# Patient Record
Sex: MALE | Race: WHITE | NOT HISPANIC OR LATINO | URBAN - METROPOLITAN AREA
[De-identification: names, ages, dates, MRNs, and addresses within clinical notes are randomized per-mention and may not be internally consistent; named-entity substitution may affect disease eponyms.]

---

## 2017-01-17 ENCOUNTER — COMMUNICATION - HEALTHEAST (OUTPATIENT)
Dept: FAMILY MEDICINE | Facility: CLINIC | Age: 42
End: 2017-01-17

## 2017-02-13 ENCOUNTER — COMMUNICATION - HEALTHEAST (OUTPATIENT)
Dept: FAMILY MEDICINE | Facility: CLINIC | Age: 42
End: 2017-02-13

## 2017-02-13 DIAGNOSIS — F41.1 ANXIETY STATE: ICD-10-CM

## 2017-03-17 ENCOUNTER — COMMUNICATION - HEALTHEAST (OUTPATIENT)
Dept: FAMILY MEDICINE | Facility: CLINIC | Age: 42
End: 2017-03-17

## 2017-03-17 DIAGNOSIS — K21.9 ESOPHAGEAL REFLUX: ICD-10-CM

## 2017-04-16 ENCOUNTER — COMMUNICATION - HEALTHEAST (OUTPATIENT)
Dept: FAMILY MEDICINE | Facility: CLINIC | Age: 42
End: 2017-04-16

## 2017-04-16 DIAGNOSIS — I10 HYPERTENSION: ICD-10-CM

## 2017-05-18 ENCOUNTER — COMMUNICATION - HEALTHEAST (OUTPATIENT)
Dept: FAMILY MEDICINE | Facility: CLINIC | Age: 42
End: 2017-05-18

## 2017-05-18 DIAGNOSIS — F41.1 ANXIETY STATE: ICD-10-CM

## 2017-07-19 ENCOUNTER — COMMUNICATION - HEALTHEAST (OUTPATIENT)
Dept: FAMILY MEDICINE | Facility: CLINIC | Age: 42
End: 2017-07-19

## 2017-07-19 DIAGNOSIS — I10 HYPERTENSION: ICD-10-CM

## 2017-08-01 ENCOUNTER — OFFICE VISIT - HEALTHEAST (OUTPATIENT)
Dept: FAMILY MEDICINE | Facility: CLINIC | Age: 42
End: 2017-08-01

## 2017-08-01 DIAGNOSIS — E66.3 OVERWEIGHT: ICD-10-CM

## 2017-08-01 DIAGNOSIS — L82.1 SEBORRHEIC KERATOSIS: ICD-10-CM

## 2017-08-01 DIAGNOSIS — F41.1 ANXIETY STATE: ICD-10-CM

## 2017-08-01 DIAGNOSIS — Z00.00 WELL ADULT EXAM: ICD-10-CM

## 2017-08-01 DIAGNOSIS — I10 ESSENTIAL HYPERTENSION, BENIGN: ICD-10-CM

## 2017-08-01 DIAGNOSIS — K21.9 ESOPHAGEAL REFLUX: ICD-10-CM

## 2017-08-01 LAB
CHOLEST SERPL-MCNC: 264 MG/DL
FASTING STATUS PATIENT QL REPORTED: YES
HDLC SERPL-MCNC: 53 MG/DL
LDLC SERPL CALC-MCNC: 189 MG/DL
TRIGL SERPL-MCNC: 110 MG/DL

## 2017-08-01 ASSESSMENT — MIFFLIN-ST. JEOR: SCORE: 1966.51

## 2017-08-29 ENCOUNTER — RECORDS - HEALTHEAST (OUTPATIENT)
Dept: ADMINISTRATIVE | Facility: OTHER | Age: 42
End: 2017-08-29

## 2017-09-28 ENCOUNTER — RECORDS - HEALTHEAST (OUTPATIENT)
Dept: ADMINISTRATIVE | Facility: OTHER | Age: 42
End: 2017-09-28

## 2018-01-15 ENCOUNTER — OFFICE VISIT - HEALTHEAST (OUTPATIENT)
Dept: FAMILY MEDICINE | Facility: CLINIC | Age: 43
End: 2018-01-15

## 2018-01-15 DIAGNOSIS — R10.9 RIGHT SIDED ABDOMINAL PAIN: ICD-10-CM

## 2018-01-15 DIAGNOSIS — I10 ESSENTIAL HYPERTENSION, BENIGN: ICD-10-CM

## 2018-01-15 DIAGNOSIS — R74.01 ELEVATED ALT MEASUREMENT: ICD-10-CM

## 2018-01-15 DIAGNOSIS — Z78.9 ALCOHOL USE: ICD-10-CM

## 2018-01-15 DIAGNOSIS — E27.9 LESION OF ADRENAL GLAND (H): ICD-10-CM

## 2018-01-15 DIAGNOSIS — F41.1 ANXIETY STATE: ICD-10-CM

## 2018-01-15 DIAGNOSIS — K21.9 ESOPHAGEAL REFLUX: ICD-10-CM

## 2018-01-15 LAB
ALBUMIN SERPL-MCNC: 4.2 G/DL (ref 3.5–5)
ALP SERPL-CCNC: 66 U/L (ref 45–120)
ALT SERPL W P-5'-P-CCNC: 39 U/L (ref 0–45)
ANION GAP SERPL CALCULATED.3IONS-SCNC: 7 MMOL/L (ref 5–18)
AST SERPL W P-5'-P-CCNC: 21 U/L (ref 0–40)
BILIRUB SERPL-MCNC: 1.9 MG/DL (ref 0–1)
BUN SERPL-MCNC: 14 MG/DL (ref 8–22)
CALCIUM SERPL-MCNC: 9.9 MG/DL (ref 8.5–10.5)
CHLORIDE BLD-SCNC: 102 MMOL/L (ref 98–107)
CO2 SERPL-SCNC: 29 MMOL/L (ref 22–31)
CREAT SERPL-MCNC: 0.82 MG/DL (ref 0.7–1.3)
ERYTHROCYTE [DISTWIDTH] IN BLOOD BY AUTOMATED COUNT: 10.9 % (ref 11–14.5)
GFR SERPL CREATININE-BSD FRML MDRD: >60 ML/MIN/1.73M2
GLUCOSE BLD-MCNC: 96 MG/DL (ref 70–125)
HCT VFR BLD AUTO: 50.6 % (ref 40–54)
HGB BLD-MCNC: 17.2 G/DL (ref 14–18)
MCH RBC QN AUTO: 33.6 PG (ref 27–34)
MCHC RBC AUTO-ENTMCNC: 34.1 G/DL (ref 32–36)
MCV RBC AUTO: 98 FL (ref 80–100)
PLATELET # BLD AUTO: 195 THOU/UL (ref 140–440)
PMV BLD AUTO: 8.3 FL (ref 7–10)
POTASSIUM BLD-SCNC: 5 MMOL/L (ref 3.5–5)
PROT SERPL-MCNC: 7.8 G/DL (ref 6–8)
RBC # BLD AUTO: 5.14 MILL/UL (ref 4.4–6.2)
SODIUM SERPL-SCNC: 138 MMOL/L (ref 136–145)
WBC: 6.1 THOU/UL (ref 4–11)

## 2018-01-15 ASSESSMENT — MIFFLIN-ST. JEOR: SCORE: 1965.09

## 2018-02-20 ENCOUNTER — COMMUNICATION - HEALTHEAST (OUTPATIENT)
Dept: FAMILY MEDICINE | Facility: CLINIC | Age: 43
End: 2018-02-20

## 2018-02-20 DIAGNOSIS — K21.9 ESOPHAGEAL REFLUX: ICD-10-CM

## 2018-04-16 ENCOUNTER — COMMUNICATION - HEALTHEAST (OUTPATIENT)
Dept: FAMILY MEDICINE | Facility: CLINIC | Age: 43
End: 2018-04-16

## 2018-04-17 ENCOUNTER — COMMUNICATION - HEALTHEAST (OUTPATIENT)
Dept: FAMILY MEDICINE | Facility: CLINIC | Age: 43
End: 2018-04-17

## 2018-04-17 DIAGNOSIS — K21.9 ESOPHAGEAL REFLUX: ICD-10-CM

## 2018-07-25 ENCOUNTER — COMMUNICATION - HEALTHEAST (OUTPATIENT)
Dept: FAMILY MEDICINE | Facility: CLINIC | Age: 43
End: 2018-07-25

## 2018-07-25 DIAGNOSIS — F41.1 ANXIETY STATE: ICD-10-CM

## 2018-10-02 ENCOUNTER — COMMUNICATION - HEALTHEAST (OUTPATIENT)
Dept: FAMILY MEDICINE | Facility: CLINIC | Age: 43
End: 2018-10-02

## 2018-10-02 DIAGNOSIS — K21.9 ESOPHAGEAL REFLUX: ICD-10-CM

## 2018-10-04 ENCOUNTER — COMMUNICATION - HEALTHEAST (OUTPATIENT)
Dept: FAMILY MEDICINE | Facility: CLINIC | Age: 43
End: 2018-10-04

## 2018-10-04 ENCOUNTER — RECORDS - HEALTHEAST (OUTPATIENT)
Dept: ADMINISTRATIVE | Facility: OTHER | Age: 43
End: 2018-10-04

## 2018-10-04 ENCOUNTER — COMMUNICATION - HEALTHEAST (OUTPATIENT)
Dept: ADMINISTRATIVE | Facility: CLINIC | Age: 43
End: 2018-10-04

## 2018-10-04 DIAGNOSIS — I10 HYPERTENSION: ICD-10-CM

## 2018-10-08 ENCOUNTER — AMBULATORY - HEALTHEAST (OUTPATIENT)
Dept: FAMILY MEDICINE | Facility: CLINIC | Age: 43
End: 2018-10-08

## 2018-10-21 ENCOUNTER — COMMUNICATION - HEALTHEAST (OUTPATIENT)
Dept: FAMILY MEDICINE | Facility: CLINIC | Age: 43
End: 2018-10-21

## 2018-10-21 DIAGNOSIS — F41.1 ANXIETY STATE: ICD-10-CM

## 2018-11-26 ENCOUNTER — COMMUNICATION - HEALTHEAST (OUTPATIENT)
Dept: FAMILY MEDICINE | Facility: CLINIC | Age: 43
End: 2018-11-26

## 2018-11-26 DIAGNOSIS — I10 HYPERTENSION: ICD-10-CM

## 2018-11-27 ENCOUNTER — COMMUNICATION - HEALTHEAST (OUTPATIENT)
Dept: FAMILY MEDICINE | Facility: CLINIC | Age: 43
End: 2018-11-27

## 2018-12-27 ENCOUNTER — COMMUNICATION - HEALTHEAST (OUTPATIENT)
Dept: FAMILY MEDICINE | Facility: CLINIC | Age: 43
End: 2018-12-27

## 2018-12-27 DIAGNOSIS — K21.9 ESOPHAGEAL REFLUX: ICD-10-CM

## 2019-01-17 ENCOUNTER — COMMUNICATION - HEALTHEAST (OUTPATIENT)
Dept: FAMILY MEDICINE | Facility: CLINIC | Age: 44
End: 2019-01-17

## 2019-01-17 DIAGNOSIS — F41.1 ANXIETY STATE: ICD-10-CM

## 2019-04-04 ENCOUNTER — COMMUNICATION - HEALTHEAST (OUTPATIENT)
Dept: FAMILY MEDICINE | Facility: CLINIC | Age: 44
End: 2019-04-04

## 2019-04-04 DIAGNOSIS — K21.9 ESOPHAGEAL REFLUX: ICD-10-CM

## 2019-04-17 ENCOUNTER — COMMUNICATION - HEALTHEAST (OUTPATIENT)
Dept: FAMILY MEDICINE | Facility: CLINIC | Age: 44
End: 2019-04-17

## 2019-04-17 DIAGNOSIS — F41.1 ANXIETY STATE: ICD-10-CM

## 2019-04-24 ENCOUNTER — OFFICE VISIT - HEALTHEAST (OUTPATIENT)
Dept: FAMILY MEDICINE | Facility: CLINIC | Age: 44
End: 2019-04-24

## 2019-04-24 DIAGNOSIS — F41.1 GENERALIZED ANXIETY DISORDER: ICD-10-CM

## 2019-04-24 DIAGNOSIS — K21.9 ESOPHAGEAL REFLUX: ICD-10-CM

## 2019-04-24 DIAGNOSIS — E78.49 OTHER HYPERLIPIDEMIA: ICD-10-CM

## 2019-04-24 DIAGNOSIS — I10 ESSENTIAL HYPERTENSION, BENIGN: ICD-10-CM

## 2019-04-24 LAB
ALBUMIN SERPL-MCNC: 4.3 G/DL (ref 3.5–5)
ALP SERPL-CCNC: 63 U/L (ref 45–120)
ALT SERPL W P-5'-P-CCNC: 51 U/L (ref 0–45)
ANION GAP SERPL CALCULATED.3IONS-SCNC: 10 MMOL/L (ref 5–18)
AST SERPL W P-5'-P-CCNC: 25 U/L (ref 0–40)
BILIRUB SERPL-MCNC: 1.7 MG/DL (ref 0–1)
BUN SERPL-MCNC: 15 MG/DL (ref 8–22)
CALCIUM SERPL-MCNC: 10.3 MG/DL (ref 8.5–10.5)
CHLORIDE BLD-SCNC: 102 MMOL/L (ref 98–107)
CHOLEST SERPL-MCNC: 227 MG/DL
CO2 SERPL-SCNC: 26 MMOL/L (ref 22–31)
CREAT SERPL-MCNC: 0.84 MG/DL (ref 0.7–1.3)
FASTING STATUS PATIENT QL REPORTED: NO
GFR SERPL CREATININE-BSD FRML MDRD: >60 ML/MIN/1.73M2
GLUCOSE BLD-MCNC: 86 MG/DL (ref 70–125)
HDLC SERPL-MCNC: 54 MG/DL
LDLC SERPL CALC-MCNC: 136 MG/DL
POTASSIUM BLD-SCNC: 4.7 MMOL/L (ref 3.5–5)
PROT SERPL-MCNC: 7.5 G/DL (ref 6–8)
SODIUM SERPL-SCNC: 138 MMOL/L (ref 136–145)
TRIGL SERPL-MCNC: 183 MG/DL

## 2019-04-24 ASSESSMENT — MIFFLIN-ST. JEOR: SCORE: 2000.53

## 2019-07-12 ENCOUNTER — COMMUNICATION - HEALTHEAST (OUTPATIENT)
Dept: FAMILY MEDICINE | Facility: CLINIC | Age: 44
End: 2019-07-12

## 2019-07-12 DIAGNOSIS — K21.9 ESOPHAGEAL REFLUX: ICD-10-CM

## 2019-07-22 ENCOUNTER — COMMUNICATION - HEALTHEAST (OUTPATIENT)
Dept: FAMILY MEDICINE | Facility: CLINIC | Age: 44
End: 2019-07-22

## 2019-07-22 DIAGNOSIS — F41.1 ANXIETY STATE: ICD-10-CM

## 2019-08-28 ENCOUNTER — COMMUNICATION - HEALTHEAST (OUTPATIENT)
Dept: FAMILY MEDICINE | Facility: CLINIC | Age: 44
End: 2019-08-28

## 2019-08-28 DIAGNOSIS — F41.1 ANXIETY STATE: ICD-10-CM

## 2019-08-28 DIAGNOSIS — F41.1 GENERALIZED ANXIETY DISORDER: ICD-10-CM

## 2019-11-07 ENCOUNTER — APPOINTMENT (OUTPATIENT)
Age: 44
Setting detail: DERMATOLOGY
End: 2019-11-07

## 2019-11-07 VITALS — RESPIRATION RATE: 16 BRPM | HEIGHT: 73 IN | WEIGHT: 225 LBS

## 2019-11-07 DIAGNOSIS — I83.9 ASYMPTOMATIC VARICOSE VEINS OF LOWER EXTREMITIES: ICD-10-CM

## 2019-11-07 PROBLEM — I83.92 ASYMPTOMATIC VARICOSE VEINS OF LEFT LOWER EXTREMITY: Status: ACTIVE | Noted: 2019-11-07

## 2019-11-07 PROCEDURE — 99213 OFFICE O/P EST LOW 20 MIN: CPT

## 2019-11-07 PROCEDURE — OTHER COUNSELING: OTHER

## 2019-11-07 PROCEDURE — OTHER ADDITIONAL NOTES: OTHER

## 2019-11-07 ASSESSMENT — LOCATION DETAILED DESCRIPTION DERM: LOCATION DETAILED: LEFT LATERAL DISTAL PRETIBIAL REGION

## 2019-11-07 ASSESSMENT — LOCATION ZONE DERM: LOCATION ZONE: LEG

## 2019-11-07 ASSESSMENT — LOCATION SIMPLE DESCRIPTION DERM: LOCATION SIMPLE: LEFT PRETIBIAL REGION

## 2019-11-07 NOTE — PROCEDURE: ADDITIONAL NOTES
Detail Level: Simple
Additional Notes: Discussed removal with a vein specialist if pt desires. Compression stockings (15-20mm of mercury) were recommended to help and to prevent new ones from starting up. Gave ressurance this spot is not something to be worried about but could potentially cause pt problems in the future such as ulcers.

## 2019-11-07 NOTE — HPI: SKIN LESION
Is This A New Presentation, Or A Follow-Up?: Skin Lesions
Additional History: No change since noticing. Maybe the upper lesion had shrunk. Has a history of cysts.

## 2019-11-14 ENCOUNTER — COMMUNICATION - HEALTHEAST (OUTPATIENT)
Dept: FAMILY MEDICINE | Facility: CLINIC | Age: 44
End: 2019-11-14

## 2019-11-24 ENCOUNTER — COMMUNICATION - HEALTHEAST (OUTPATIENT)
Dept: FAMILY MEDICINE | Facility: CLINIC | Age: 44
End: 2019-11-24

## 2019-11-24 DIAGNOSIS — I10 HYPERTENSION: ICD-10-CM

## 2020-02-28 ENCOUNTER — RECORDS - HEALTHEAST (OUTPATIENT)
Dept: ADMINISTRATIVE | Facility: OTHER | Age: 45
End: 2020-02-28

## 2020-03-21 ENCOUNTER — COMMUNICATION - HEALTHEAST (OUTPATIENT)
Dept: FAMILY MEDICINE | Facility: CLINIC | Age: 45
End: 2020-03-21

## 2020-03-23 ENCOUNTER — OFFICE VISIT - HEALTHEAST (OUTPATIENT)
Dept: FAMILY MEDICINE | Facility: CLINIC | Age: 45
End: 2020-03-23

## 2020-03-23 DIAGNOSIS — I10 ESSENTIAL HYPERTENSION, BENIGN: ICD-10-CM

## 2020-04-28 ENCOUNTER — COMMUNICATION - HEALTHEAST (OUTPATIENT)
Dept: FAMILY MEDICINE | Facility: CLINIC | Age: 45
End: 2020-04-28

## 2020-04-29 ENCOUNTER — AMBULATORY - HEALTHEAST (OUTPATIENT)
Dept: FAMILY MEDICINE | Facility: CLINIC | Age: 45
End: 2020-04-29

## 2020-04-29 DIAGNOSIS — I10 ESSENTIAL HYPERTENSION, BENIGN: ICD-10-CM

## 2020-05-15 ENCOUNTER — COMMUNICATION - HEALTHEAST (OUTPATIENT)
Dept: FAMILY MEDICINE | Facility: CLINIC | Age: 45
End: 2020-05-15

## 2020-05-15 DIAGNOSIS — I10 ESSENTIAL HYPERTENSION, BENIGN: ICD-10-CM

## 2020-05-26 ENCOUNTER — AMBULATORY - HEALTHEAST (OUTPATIENT)
Dept: FAMILY MEDICINE | Facility: CLINIC | Age: 45
End: 2020-05-26

## 2020-05-26 DIAGNOSIS — I10 ESSENTIAL HYPERTENSION, BENIGN: ICD-10-CM

## 2020-07-09 ENCOUNTER — COMMUNICATION - HEALTHEAST (OUTPATIENT)
Dept: FAMILY MEDICINE | Facility: CLINIC | Age: 45
End: 2020-07-09

## 2020-07-09 DIAGNOSIS — K21.9 ESOPHAGEAL REFLUX: ICD-10-CM

## 2020-09-24 ENCOUNTER — COMMUNICATION - HEALTHEAST (OUTPATIENT)
Dept: FAMILY MEDICINE | Facility: CLINIC | Age: 45
End: 2020-09-24

## 2020-09-24 DIAGNOSIS — F41.1 GENERALIZED ANXIETY DISORDER: ICD-10-CM

## 2020-09-25 ENCOUNTER — COMMUNICATION - HEALTHEAST (OUTPATIENT)
Dept: FAMILY MEDICINE | Facility: CLINIC | Age: 45
End: 2020-09-25

## 2020-09-25 DIAGNOSIS — F41.1 GENERALIZED ANXIETY DISORDER: ICD-10-CM

## 2020-09-25 DIAGNOSIS — I10 ESSENTIAL HYPERTENSION, BENIGN: ICD-10-CM

## 2020-09-25 DIAGNOSIS — K21.9 ESOPHAGEAL REFLUX: ICD-10-CM

## 2020-09-25 RX ORDER — AMLODIPINE BESYLATE 10 MG/1
10 TABLET ORAL DAILY
Qty: 90 TABLET | Refills: 3 | Status: SHIPPED | OUTPATIENT
Start: 2020-09-25

## 2020-09-25 RX ORDER — LISINOPRIL 40 MG/1
40 TABLET ORAL DAILY
Qty: 90 TABLET | Refills: 3 | Status: SHIPPED | OUTPATIENT
Start: 2020-09-25

## 2020-10-20 ENCOUNTER — RECORDS - HEALTHEAST (OUTPATIENT)
Dept: ADMINISTRATIVE | Facility: OTHER | Age: 45
End: 2020-10-20

## 2021-05-27 NOTE — TELEPHONE ENCOUNTER
RN cannot approve Refill Request    RN can NOT refill this medication overdue for office visits and/or labs.    Sudhakar Mckee, [facility] Triage/Med Refill 4/18/2019    Requested Prescriptions   Pending Prescriptions Disp Refills     sertraline (ZOLOFT) 25 MG tablet [Pharmacy Med Name: SERTRALINE 25MG TABLETS] 90 tablet 0     Sig: TAKE 1 TABLET BY MOUTH DAILY       [facility] Refill Protocol  Failed - 4/17/2019  3:43 AM        Failed - PCP or prescribing provider visit in last year     Last office visit with prescriber/PCP: 1/15/2018 Freedom Albright DO OR same dept: Visit date not found OR same specialty: 1/15/2018 Freedom Albright DO  Last physical: 8/1/2017 Last MTM visit: Visit date not found   Next visit within 3 mo: Visit date not found  Next physical within 3 mo: Visit date not found  Prescriber OR PCP: Freedom Nice DO  Last diagnosis associated with med order: 1. Anxiety state  - sertraline (ZOLOFT) 25 MG tablet [Pharmacy Med Name: SERTRALINE 25MG TABLETS]; TAKE 1 TABLET BY MOUTH DAILY  Dispense: 90 tablet; Refill: 0    If protocol passes may refill for 12 months if within 3 months of last provider visit (or a total of 15 months).

## 2021-05-27 NOTE — TELEPHONE ENCOUNTER
I could refill his medication, but he is overdue for follow-up appointment.  Please help him schedule this.  Thank you, DE

## 2021-05-27 NOTE — TELEPHONE ENCOUNTER
RN cannot approve Refill Request    RN can NOT refill this medication PCP messaged that patient is overdue for Office Visit.       Desirae Mcbride, [facility] Triage/Med Refill 4/5/2019    Requested Prescriptions   Pending Prescriptions Disp Refills     omeprazole (PRILOSEC) 20 MG capsule [Pharmacy Med Name: OMEPRAZOLE 20MG CAPSULES] 90 capsule 0     Sig: TAKE 1 CAPSULE BY MOUTH DAILY    GI Medications Refill Protocol Failed - 4/4/2019  3:43 AM       Failed - PCP or prescribing provider visit in last 12 or next 3 months.    Last office visit with prescriber/PCP: 1/15/2018 Freedom Albright DO OR same dept: Visit date not found OR same specialty: 1/15/2018 Freedom Albright DO  Last physical: 8/1/2017 Last MTM visit: Visit date not found   Next visit within 3 mo: Visit date not found  Next physical within 3 mo: Visit date not found  Prescriber OR PCP: Freedom Nice DO  Last diagnosis associated with med order: 1. Esophageal reflux  - omeprazole (PRILOSEC) 20 MG capsule [Pharmacy Med Name: OMEPRAZOLE 20MG CAPSULES]; TAKE 1 CAPSULE BY MOUTH DAILY  Dispense: 90 capsule; Refill: 0    If protocol passes may refill for 12 months if within 3 months of last provider visit (or a total of 15 months).

## 2021-05-28 NOTE — PROGRESS NOTES
Assessment / Impression     1. Generalized anxiety disorder  sertraline (ZOLOFT) 25 MG tablet   2. Benign Essential Hypertension  lisinopril (PRINIVIL,ZESTRIL) 40 MG tablet   3. Other hyperlipidemia  Comprehensive Metabolic Panel    Lipid Cascade   4. Esophageal reflux  omeprazole (PRILOSEC) 20 MG capsule         Plan:     We discussed the possibility of increasing the sertraline dose to 50 mg daily, but he prefers to stay at 25 mg daily for now.  He is going to work on stress reduction and see how things go over the next few weeks and he will let me know if he changes his mind and would like to bump up the dose.  His blood pressure is under adequate control with lisinopril 40 mg daily.  He reports that he took this medication just prior to his appointment.  I recommended that we check a CMP and cholesterol panel.  He continues to take omeprazole for esophageal reflux.  He has seen GI about this in the past.  He has tried ranitidine in the past which was not helpful.    Subjective:      HPI: Zack Lugo is a 43 y.o. male who presents to the clinic for follow-up.  He has a medical history significant for anxiety, hypertension, hyperlipidemia and GERD.  He takes sertraline 25 mg daily which he tolerates well.  Feels like his anxiety has been getting slightly worse over the past few months.  He is not sure what may be triggering this.  He has thought about possibly increasing the dose of the sertraline.  He continues to take lisinopril 40 mg daily for blood pressure control.  He is not having headaches or blurry vision.  He is staying physically active and try to eat healthy foods.  On 8/1/2017 his LDL cholesterol was high at 189.  On 1/15/2018 he had a normal hemogram and CMP.  If he struggles with significant GERD symptoms.  His last upper endoscopy was in 2017 which showed gastric polyps.  He has tried ranitidine in the past which did not control symptoms.  Omeprazole continues to help.        Review of Systems  All  "other systems reviewed and are negative.     Social History     Tobacco Use   Smoking Status Former Smoker     Types: Cigarettes     Last attempt to quit: 2011     Years since quittin.2   Smokeless Tobacco Never Used       Family History   Problem Relation Age of Onset     Hypertension Mother      Hypertension Father      Depression Sister      Anxiety disorder Sister      Thyroid disease Paternal Uncle      Thyroid disease Paternal Grandfather        Objective:     /86 (Patient Site: Right Arm, Patient Position: Sitting, Cuff Size: Adult Large)   Pulse 84   Temp 98.6  F (37  C) (Oral)   Resp 16   Ht 6' 2.7\" (1.897 m)   Wt (!) 227 lb (103 kg)   BMI 28.60 kg/m    Physical Examination: General appearance - alert, well appearing, and in no distress  Eyes: pupils equal and reactive, extraocular eye movements intact  Mouth: mucous membranes moist, pharynx normal without lesions  Neck: supple, no significant adenopathy  Lungs: clear to auscultation, no wheezes, rales or rhonchi, symmetric air entry  Heart: normal rate, regular rhythm, normal S1, S2, no murmurs, rubs, clicks or gallops  Abdomen: soft, nontender, nondistended, no masses or organomegaly  Neurological: alert, oriented, normal speech, no focal findings or movement disorder noted.    Extremities: No edema, no clubbing or cyanosis  Psychiatric: Normal affect. Does not appear anxious or depressed.    No results found for this or any previous visit (from the past 168 hour(s)).    Current Outpatient Medications   Medication Sig     lisinopril (PRINIVIL,ZESTRIL) 40 MG tablet TAKE 1 TABLET BY MOUTH DAILY     [START ON 2019] omeprazole (PRILOSEC) 20 MG capsule Take 1 capsule (20 mg total) by mouth daily.     sertraline (ZOLOFT) 25 MG tablet Take 1 tablet (25 mg total) by mouth daily.     "

## 2021-05-30 NOTE — TELEPHONE ENCOUNTER
Outpatient Medication Detail      Disp Refills Start End    omeprazole (PRILOSEC) 20 MG capsule 90 capsule 3 7/5/2019     Sig - Route: Take 1 capsule (20 mg total) by mouth daily. - Oral    Sent to pharmacy as: omeprazole (PRILOSEC) 20 MG capsule    E-Prescribing Status: Receipt confirmed by pharmacy (4/24/2019 10:26 AM CDT)    Associated Diagnoses     Esophageal reflux         Katina Vega RN, [facility] Nurse Triage/Med Refills RN

## 2021-05-31 VITALS — WEIGHT: 219.19 LBS | HEIGHT: 75 IN | BODY MASS INDEX: 27.25 KG/M2

## 2021-05-31 VITALS — WEIGHT: 219.5 LBS | BODY MASS INDEX: 27.29 KG/M2 | HEIGHT: 75 IN

## 2021-06-02 ENCOUNTER — RECORDS - HEALTHEAST (OUTPATIENT)
Dept: ADMINISTRATIVE | Facility: CLINIC | Age: 46
End: 2021-06-02

## 2021-06-03 VITALS — WEIGHT: 227 LBS | BODY MASS INDEX: 28.23 KG/M2 | HEIGHT: 75 IN

## 2021-06-03 NOTE — TELEPHONE ENCOUNTER
Refill Approved    Rx renewed per Medication Renewal Policy. Medication was last renewed on 4/24/19.    Nissa Norman, [facility] Triage/Med Refill 11/24/2019     Requested Prescriptions   Pending Prescriptions Disp Refills     lisinopril (PRINIVIL,ZESTRIL) 40 MG tablet [Pharmacy Med Name: LISINOPRIL 40MG TABLETS] 90 tablet 0     Sig: TAKE 1 TABLET BY MOUTH DAILY       [facility] Refill Protocol Passed - 11/24/2019  3:44 AM        Passed - PCP or prescribing provider visit in past 12 months       Last office visit with prescriber/PCP: 4/24/2019 Freedom Albright DO OR same dept: 4/24/2019 Freedom Albright DO OR same specialty: 4/24/2019 Freedom Albright DO  Last physical: 8/1/2017 Last MTM visit: Visit date not found   Next visit within 3 mo: Visit date not found  Next physical within 3 mo: Visit date not found  Prescriber OR PCP: Freedom Nice DO  Last diagnosis associated with med order: 1. Hypertension  - lisinopril (PRINIVIL,ZESTRIL) 40 MG tablet [Pharmacy Med Name: LISINOPRIL 40MG TABLETS]; TAKE 1 TABLET BY MOUTH DAILY  Dispense: 90 tablet; Refill: 0    If protocol passes may refill for 12 months if within 3 months of last provider visit (or a total of 15 months).             Passed - Serum Potassium in past 12 months     Lab Results   Component Value Date    Potassium 4.7 04/24/2019             Passed - Blood pressure filed in past 12 months     BP Readings from Last 1 Encounters:   04/24/19 132/86             Passed - Serum Creatinine in past 12 months     Creatinine   Date Value Ref Range Status   04/24/2019 0.84 0.70 - 1.30 mg/dL Final

## 2021-06-07 NOTE — PROGRESS NOTES
"Zack Lugo is a 44 y.o. male who is being evaluated via a billable telephone visit.      The patient has been notified of following:     \"This telephone visit will be conducted via a call between you and your physician/provider. We have found that certain health care needs can be provided without the need for a physical exam.  This service lets us provide the care you need with a short phone conversation.  If a prescription is necessary we can send it directly to your pharmacy.  If lab work is needed we can place an order for that and you can then stop by our lab to have the test done at a later time.    If during the course of the call the physician/provider feels a telephone visit is not appropriate, you will not be charged for this service.\"     Zack Lugo complains of    Chief Complaint   Patient presents with     Hypertension       I have reviewed and updated the patient's Past Medical History, Social History, Family History and Medication List.    ALLERGIES  Patient has no known allergies.    Additional provider notes: I spoke to Zack by phone.  He reports that his blood pressures have still been running borderline high at home.  He typically gets numbers in the mid 130s/low 90s.  3 weeks ago he was diagnosed with influenza A.  During that office visit his blood pressure was 150/100.  He did not take his blood pressure medication, lisinopril 40 mg, that day.  Denies having headaches and does not feel symptoms when his blood pressure is running high.  He is wondering if any additional medications need to be added to his regimen.  He has consistently had normal electrolyte and kidney function testing in the past.  His last CMP was on 4/24/2019.    Assessment/Plan:  1. Benign Essential Hypertension  His blood pressure still running at least borderline high at home.  He recently had a very high blood pressure reading when he was diagnosed with influenza A.  He has recovered from this illness.  We discussed " treatment options and decided to add hydrochlorothiazide 12.5 mg daily.  He will continue to monitor his blood pressure closely at home and let me know how he is doing in the next few weeks.  He will tentatively plan to come back for follow-up visit and lab check in a few months once the COVID-19 pandemic settles down.  - hydroCHLOROthiazide (MICROZIDE) 12.5 mg capsule; Take 1 capsule (12.5 mg total) by mouth daily.  Dispense: 90 capsule; Refill: 3      Phone call duration: 15 minutes     Freedom Nice,

## 2021-06-11 NOTE — TELEPHONE ENCOUNTER
RX cued up for correct pharmacy.  [facility].    Requested Prescriptions     Pending Prescriptions Disp Refills     sertraline (ZOLOFT) 50 MG tablet 90 tablet 3     Sig: Take 1 tablet (50 mg total) by mouth daily.

## 2021-06-12 NOTE — PROGRESS NOTES
Assessment:     1. Well adult exam  Thyroid Stimulating Hormone (TSH)    T4, Free    Comprehensive Metabolic Panel    Lipid Cascade   2. Benign Essential Hypertension     3. Anxiety Disorder NOS     4. Esophageal reflux     5. Overweight     6. Anxiety state  sertraline (ZOLOFT) 25 MG tablet   7. Seborrheic keratosis         The following high BMI interventions were performed this visit: encouragement to exercise and lifestyle education regarding diet     Plan:      I recommended that we check the above labs.  He is interested in having thyroid testing done today due to a strong family history of thyroid disease.  He will be notified of these results when they are available.  His blood pressure came down of the second check.  He will continue lisinopril 40 mg daily.  He was given a refill of sertraline which continues to work well for anxiety symptoms.  He will be following up with GI for further evaluation of his ongoing esophageal reflux symptoms.  I explained that the skin lesion on his right flank is consistent with seborrheic keratosis.  We discussed treatment options and decided to pursue cryotherapy since this has caused some irritation and itching.  I applied 3 freeze thaw cycles which he tolerated well.  Wound care instructions were given.     All questions answered.  Discussed healthy lifestyle modifications.  Follow up as needed.     Subjective:      Zack Lugo is a 41 y.o. male who presents for an annual exam. The patient reports that there is not domestic violence in his life.  He denies having concerns regarding hearing, vision, urination, bowel movements, sleep or mood.  He has been on sertraline for anxiety.  He finds this medication to be quite helpful, even at the low dose.  He also has a history of hypertension and is on lisinopril 40 mg daily.  When he checks his blood pressure at home and has been running in the 120s-130s over 70s-80s.  He gets nervous when having his blood pressure checked in  the office.  This has led to high blood pressure readings in our clinic in the past.  He also has a history of GERD and takes omeprazole.  The symptoms improve when he modifies his diet.  He schedule an appointment to see a GI specialist to further evaluate for this to see if he should have an upper endoscopy at some point.    He is concerned about a scaly skin lesion on the right flank.  This occasionally becomes inflamed and itchy.  Couple of times and has peeled off, but came back again.    Healthy Habits:   Regular Exercise: No  Sunscreen Use: No  Healthy Diet: Yes  Dental Visits Regularly: No  Seat Belt: Yes  Sexually active: Yes  Monthly Self Testicular Exams:  No  Hemoccults: No  Flex Sig: No  Colonoscopy: No  Lipid Profile: 2015  Glucose Screen: 2015  Prevention of Osteoporosis: No  Last Dexa: N/A  Guns at Home:  N/A      Immunization History   Administered Date(s) Administered     DT (pediatric) 07/19/2002     Hep A, historic 01/11/2010, 02/17/2014     Influenza, inj, historic 12/01/2015     Influenza,seasonal quad, PF 01/15/2014     Td, historic 07/19/2002     Tdap 01/11/2010     Immunization status: up to date and documented.    No exam data present    Current Outpatient Prescriptions   Medication Sig Dispense Refill     lisinopril (PRINIVIL,ZESTRIL) 40 MG tablet TAKE 1 TABLET BY MOUTH DAILY 90 tablet 3     omeprazole (PRILOSEC) 20 MG capsule TAKE 1 CAPSULE DAILY 90 capsule 3     sertraline (ZOLOFT) 25 MG tablet TAKE 1 TABLET BY MOUTH ONCE DAILY 90 tablet 3     No current facility-administered medications for this visit.      No past medical history on file.  No past surgical history on file.  Review of patient's allergies indicates no known allergies.  Family History   Problem Relation Age of Onset     Hypertension Mother      Hypertension Father      Depression Sister      Anxiety disorder Sister      Thyroid disease Paternal Uncle      Thyroid disease Paternal Grandfather      Social History     Social  "History     Marital status:      Spouse name: N/A     Number of children: N/A     Years of education: N/A     Occupational History     Not on file.     Social History Main Topics     Smoking status: Former Smoker     Types: Cigarettes     Quit date: 2/1/2011     Smokeless tobacco: Not on file     Alcohol use Yes     Drug use: No     Sexual activity: Not on file     Other Topics Concern     Not on file     Social History Narrative       Review of Systems  General:  Denies problem  Eyes: Denies problem  Ears/Nose/Throat: Denies problem  Cardiovascular: Denies problem  Respiratory:  Denies problem  Gastrointestinal:  Denies problem  Genitourinary: Denies problem  Musculoskeletal:  Denies problem  Skin: Denies problem  Neurologic: Denies problem  Psychiatric: Denies problem  Endocrine: Denies problem  Heme/Lymphatic: Denies problem   Allergic/Immunologic: Denies problem        Objective:     Vitals:    08/01/17 1005 08/01/17 1035   BP: 152/80 132/86   Pulse: 88    Resp: 16    Temp: 98.5  F (36.9  C)    TempSrc: Oral    Weight: 219 lb 8 oz (99.6 kg)    Height: 6' 2.7\" (1.897 m)      Body mass index is 27.66 kg/(m^2).    Physical  General Appearance: Alert, cooperative, no distress, appears stated age  Head: Normocephalic, without obvious abnormality, atraumatic  Eyes: PERRL, conjunctiva/corneas clear, EOM's intact  Ears: Normal TM's and external ear canals, both ears  Nose: Nares normal, septum midline,mucosa normal, no drainage  Throat: Lips, mucosa, and tongue normal; teeth and gums normal  Neck: Supple, symmetrical, trachea midline, no adenopathy;  thyroid: not enlarged, symmetric, no tenderness/mass/nodules  Back: Symmetric, no curvature, ROM normal, no CVA tenderness  Lungs: Clear to auscultation bilaterally, respirations unlabored  Heart: Regular rate and rhythm, S1 and S2 normal, no murmur, rub, or gallop,  Abdomen: Soft, non-tender, bowel sounds active all four quadrants,  no masses, no " organomegaly  Genitourinary: Penis normal. Right testis is descended. Left testis is descended. No hernias palpated  Musculoskeletal: Normal range of motion. No joint swelling or deformity.   Extremities: Extremities normal, atraumatic, no cyanosis or edema  Skin: Skin color, texture, turgor normal, no rashes.  There is an oval scaly skin lesion on the right flank consistent with a seborrheic keratosis  Lymph nodes: Cervical, supraclavicular, and axillary nodes normal  Neurologic: He is alert.  Normal speech.  No focal deficits.  Normal deep tendon reflexes.   Psychiatric: He has a normal mood and affect.        No results found for this or any previous visit (from the past 168 hour(s)).

## 2021-06-15 NOTE — PROGRESS NOTES
Assessment / Impression     1. Right sided abdominal pain  Comprehensive Metabolic Panel    HM2(CBC w/o Differential)    Ambulatory referral to Gastroenterology   2. Esophageal reflux     3. Benign Essential Hypertension     4. Anxiety Disorder NOS     5. Elevated ALT measurement     6. Alcohol use     7. Lesion of adrenal gland           Plan:     I recommended that we check the above labs and he was referred to gastroenterology for further evaluation.  I did not reorder imaging studies today since he will be seeing the specialist.  He may take the omeprazole twice daily over the next 2 weeks and I recommended he keep a journal of symptoms to help identify triggers.  I recommended he eat a bland diet and avoid alcohol use.  I recommended he return of symptoms to help identify triggers.  His blood pressure and anxiety appear to be under good control on lisinopril and sertraline respectively.  I personally reviewed the GI consultation note from last fall as well as lab results of the EGD results.  I also personally reviewed my office visit note from 6/19/15 including the ultrasound and CT scan results from that time.    Subjective:      HPI: Zack Lugo is a 42 y.o. male who since to the clinic to be evaluated for right-sided abdominal pain symptoms he has been experiencing since last August.  He reports having a mild dull pressure in the right abdomen with cramping symptoms that he rates at a 2-3 out of 10.  Occasionally he has a flare of the pain which feels more burning and stabbing, although he admits the pain is only moderate at these times.  He feels like the pain started in the right upper quadrant then moved to the lower quadrant and flank.  He denies any changes in stooling or urine.  He denies having diarrhea, constipation or black/bloody stools.  He denies dysuria or hematuria.  He is unaware of any specific triggers.  He has a history significant for GERD without esophagitis.  He had an upper endoscopy  procedure on 9/28/17 which only showed benign gastric polyps.  He describes having issues with burping and occasional acid in his mouth.  He is currently on omeprazole 20 mg daily.  On 8/1/17 his ALT was mildly elevated at 62.  In 2015 he had an ultrasound of the liver which showed 2 small benign polyps in the gallbladder and a nodular area in the right kidney and liver.  His subsequent CT scan showed a 2.2 cm adrenal lesion that was most likely due to a benign adrenal adenoma.  There was a small nonobstructing calcification in the left kidney.    He admits to heavy drinking at various times such as family events and parties.  He reports that there have been quite a few of these over the past 5 months including a fishing trip to [place] with family members around the time his ALT was elevated.  He has cut way back on his alcohol use over the past few weeks.    He has a history of hypertension and anxiety.  He feels like these issues have been under good control on his current medications.  However, he admits that he has been feeling more anxious about the elevated liver enzyme since August and he thinks that may be contributing to his pain symptoms.    US 6/19/15   CONCLUSION:  1.  2 small benign polyps in the gallbladder.  2.  Nodular area of tissue between the right kidney and liver is not adequately characterized. Recommend further evaluation with CT.  3.  Findings discussed with Dr. Rowell at the time of dictation.      CT abdomen/pelvis 6/22/15 CONCLUSION:  1.  There is a 2.2 cm, solid adrenal lesion that enhances. This was evaluated in March of 2010 when it measured 1.7 x 1.5 cm. At that time it had a contrast washout of 70% which is usually associated with a benign adrenal adenoma. Although normally an increase in size increases the a concern for a malignant lesion, the increase in size in this patient has been over 5 years and is mild.  2.   The ACR appropriateness criteria indicate that there is not an  "exact cut off for size increase of an adrenal lesion, but in one study a size increase of 0.8 cm on followup CT had the highest combination of sensitivity, 72%,  and specificity, 81% for  detecting malignancy. However the increase in this patient has been over 5 years has been less than that. The high probability is that this is a slowly growing benign adenoma. If further evaluation is felt to be indicated  an options would include MRI.  3.  There is a small nonobstructing calcification in the left kidney.          Review of Systems  All other systems reviewed and are negative.     History   Smoking Status     Former Smoker     Types: Cigarettes     Quit date: 2/1/2011   Smokeless Tobacco     Never Used       Family History   Problem Relation Age of Onset     Hypertension Mother      Hypertension Father      Depression Sister      Anxiety disorder Sister      Thyroid disease Paternal Uncle      Thyroid disease Paternal Grandfather        Objective:     /86 (Patient Site: Right Arm, Patient Position: Sitting, Cuff Size: Adult Large)  Pulse 80  Temp 98.6  F (37  C) (Oral)   Resp 16  Ht 6' 2.7\" (1.897 m)  Wt 219 lb 3 oz (99.4 kg)  BMI 27.62 kg/m2  Physical Examination: General appearance - alert, well appearing, and in no distress  Eyes: pupils equal and reactive, extraocular eye movements intact  Mouth: mucous membranes moist, pharynx normal without lesions  Neck: supple, no significant adenopathy  Lungs: clear to auscultation, no wheezes, rales or rhonchi, symmetric air entry  Heart: normal rate, regular rhythm, normal S1, S2, no murmurs, rubs, clicks or gallops  Abdomen: soft, nontender, nondistended, no masses or organomegaly.  Neurological: alert, oriented, normal speech, no focal findings or movement disorder noted.    Extremities: No edema, no clubbing or cyanosis  Psychiatric: Normal affect. Does not appear anxious or depressed.    No results found for this or any previous visit (from the past 168 " hour(s)).    Current Outpatient Prescriptions   Medication Sig     lisinopril (PRINIVIL,ZESTRIL) 40 MG tablet TAKE 1 TABLET BY MOUTH DAILY     omeprazole (PRILOSEC) 20 MG capsule TAKE 1 CAPSULE DAILY     sertraline (ZOLOFT) 25 MG tablet TAKE 1 TABLET BY MOUTH ONCE DAILY

## 2021-06-16 PROBLEM — E66.3 OVERWEIGHT: Status: ACTIVE | Noted: 2017-08-01

## 2021-06-16 PROBLEM — E78.49 OTHER HYPERLIPIDEMIA: Status: ACTIVE | Noted: 2019-04-24

## 2021-06-23 NOTE — TELEPHONE ENCOUNTER
RN cannot approve Refill Request    RN can NOT refill this medication PCP messaged that patient is overdue for Office Visit.     Last office visit: 1/15/2018 Freedom Albright DO Last Physical: 8/1/2017 Last MTM visit: Visit date not found Last visit same specialty: 1/15/2018 Freedom Albright DO.  Next visit within 3 mo: Visit date not found  Next physical within 3 mo: Visit date not found      Hazel Soto, [facility] Triage/Med Refill 1/17/2019    Requested Prescriptions   Pending Prescriptions Disp Refills     sertraline (ZOLOFT) 25 MG tablet [Pharmacy Med Name: SERTRALINE 25MG TABLETS] 90 tablet 0     Sig: TAKE 1 TABLET(25 MG) BY MOUTH DAILY    [facility] Refill Protocol  Failed - 1/17/2019  3:43 AM       Failed - PCP or prescribing provider visit in last year    Last office visit with prescriber/PCP: 1/15/2018 Freedom Albright DO OR same dept: Visit date not found OR same specialty: 1/15/2018 Freedom Albright DO  Last physical: 8/1/2017 Last MTM visit: Visit date not found   Next visit within 3 mo: Visit date not found  Next physical within 3 mo: Visit date not found  Prescriber OR PCP: Freedom Nice DO  Last diagnosis associated with med order: 1. Anxiety state  - sertraline (ZOLOFT) 25 MG tablet [Pharmacy Med Name: SERTRALINE 25MG TABLETS]; TAKE 1 TABLET(25 MG) BY MOUTH DAILY  Dispense: 90 tablet; Refill: 0    If protocol passes may refill for 12 months if within 3 months of last provider visit (or a total of 15 months).

## 2021-08-10 DIAGNOSIS — K21.9 ESOPHAGEAL REFLUX: ICD-10-CM

## 2021-08-11 NOTE — TELEPHONE ENCOUNTER
Omeprazole:    Prescription approved per [facility] Refill Protocol.  Due for appt/physical in September.    Vero Hayes RN

## 2021-08-21 ENCOUNTER — HEALTH MAINTENANCE LETTER (OUTPATIENT)
Age: 46
End: 2021-08-21

## 2021-09-21 DIAGNOSIS — K21.9 ESOPHAGEAL REFLUX: ICD-10-CM

## 2021-09-22 NOTE — TELEPHONE ENCOUNTER
Patient has moved to [place] and plans on finding a new PCP closer to home.    One time refill.    Tere Miranda RN

## 2021-10-16 ENCOUNTER — HEALTH MAINTENANCE LETTER (OUTPATIENT)
Age: 46
End: 2021-10-16

## 2021-10-19 DIAGNOSIS — K21.9 ESOPHAGEAL REFLUX: ICD-10-CM

## 2021-10-19 NOTE — TELEPHONE ENCOUNTER
DD,   Patient needs to find new PCP   He moved to [place] and now you are at [place] - too far to go  Isn't scheduled yet - still trying to find someone  Please advise on short term refill  Thanks,  Casandra GOMEZ RN

## 2021-11-02 DIAGNOSIS — F41.1 GENERALIZED ANXIETY DISORDER: ICD-10-CM

## 2021-11-03 NOTE — TELEPHONE ENCOUNTER
Sertraline    Note from 10/19/21.  DD,   Patient needs to find new PCP   He moved to [place] and now you are at [place] - too far to go    Patient last saw DE 3/2020.    One time refill sent to pharmacy with note patient needs to establish care with new PCP for further refills.    Vero Hayes RN

## 2022-09-25 ENCOUNTER — HEALTH MAINTENANCE LETTER (OUTPATIENT)
Age: 47
End: 2022-09-25

## 2023-10-15 ENCOUNTER — HEALTH MAINTENANCE LETTER (OUTPATIENT)
Age: 48
End: 2023-10-15